# Patient Record
Sex: MALE | Race: WHITE
[De-identification: names, ages, dates, MRNs, and addresses within clinical notes are randomized per-mention and may not be internally consistent; named-entity substitution may affect disease eponyms.]

---

## 2018-11-03 ENCOUNTER — HOSPITAL ENCOUNTER (EMERGENCY)
Dept: HOSPITAL 47 - EC | Age: 38
Discharge: HOME | End: 2018-11-03
Payer: COMMERCIAL

## 2018-11-03 VITALS
HEART RATE: 74 BPM | TEMPERATURE: 98.2 F | DIASTOLIC BLOOD PRESSURE: 84 MMHG | RESPIRATION RATE: 18 BRPM | SYSTOLIC BLOOD PRESSURE: 132 MMHG

## 2018-11-03 DIAGNOSIS — F17.200: ICD-10-CM

## 2018-11-03 DIAGNOSIS — K52.9: Primary | ICD-10-CM

## 2018-11-03 LAB
ALBUMIN SERPL-MCNC: 4.4 G/DL (ref 3.5–5)
ALP SERPL-CCNC: 85 U/L (ref 38–126)
ALT SERPL-CCNC: 32 U/L (ref 21–72)
ANION GAP SERPL CALC-SCNC: 10 MMOL/L
AST SERPL-CCNC: 25 U/L (ref 17–59)
BASOPHILS # BLD AUTO: 0.1 K/UL (ref 0–0.2)
BASOPHILS NFR BLD AUTO: 1 %
BUN SERPL-SCNC: 18 MG/DL (ref 9–20)
CALCIUM SPEC-MCNC: 9.7 MG/DL (ref 8.4–10.2)
CHLORIDE SERPL-SCNC: 106 MMOL/L (ref 98–107)
CO2 SERPL-SCNC: 24 MMOL/L (ref 22–30)
EOSINOPHIL # BLD AUTO: 0.2 K/UL (ref 0–0.7)
EOSINOPHIL NFR BLD AUTO: 2 %
ERYTHROCYTE [DISTWIDTH] IN BLOOD BY AUTOMATED COUNT: 5.27 M/UL (ref 4.3–5.9)
ERYTHROCYTE [DISTWIDTH] IN BLOOD: 12.6 % (ref 11.5–15.5)
GLUCOSE SERPL-MCNC: 95 MG/DL (ref 74–99)
HCT VFR BLD AUTO: 45 % (ref 39–53)
HGB BLD-MCNC: 15 GM/DL (ref 13–17.5)
LIPASE SERPL-CCNC: 257 U/L (ref 23–300)
LYMPHOCYTES # SPEC AUTO: 2.5 K/UL (ref 1–4.8)
LYMPHOCYTES NFR SPEC AUTO: 28 %
MCH RBC QN AUTO: 28.5 PG (ref 25–35)
MCHC RBC AUTO-ENTMCNC: 33.3 G/DL (ref 31–37)
MCV RBC AUTO: 85.5 FL (ref 80–100)
MONOCYTES # BLD AUTO: 0.5 K/UL (ref 0–1)
MONOCYTES NFR BLD AUTO: 6 %
NEUTROPHILS # BLD AUTO: 5.5 K/UL (ref 1.3–7.7)
NEUTROPHILS NFR BLD AUTO: 62 %
PLATELET # BLD AUTO: 226 K/UL (ref 150–450)
POTASSIUM SERPL-SCNC: 3.9 MMOL/L (ref 3.5–5.1)
PROT SERPL-MCNC: 7.4 G/DL (ref 6.3–8.2)
SODIUM SERPL-SCNC: 140 MMOL/L (ref 137–145)
WBC # BLD AUTO: 9 K/UL (ref 3.8–10.6)

## 2018-11-03 PROCEDURE — 36415 COLL VENOUS BLD VENIPUNCTURE: CPT

## 2018-11-03 PROCEDURE — 74177 CT ABD & PELVIS W/CONTRAST: CPT

## 2018-11-03 PROCEDURE — 99284 EMERGENCY DEPT VISIT MOD MDM: CPT

## 2018-11-03 PROCEDURE — 85025 COMPLETE CBC W/AUTO DIFF WBC: CPT

## 2018-11-03 PROCEDURE — 80053 COMPREHEN METABOLIC PANEL: CPT

## 2018-11-03 PROCEDURE — 83690 ASSAY OF LIPASE: CPT

## 2018-11-03 NOTE — CT
EXAMINATION TYPE: CT abdomen pelvis w con

 

DATE OF EXAM: 11/3/2018

 

COMPARISON: None

 

HISTORY: lower abdominal pain

 

CT DLP: 1019.2 mGycm

Automated exposure control for dose reduction was used.

 

TECHNIQUE:  Helical acquisition of images was performed from the lung bases through the pelvis.

 

CONTRAST: 

Performed without Oral Contrast and with IV Contrast, patient injected with 100mL mL of Isovue 300.

 

FINDINGS: 

 

Lung bases are clear. There is no pleural effusion. Heart appears normal.

 

Liver spleen pancreas gallbladder appear normal. Bile ducts are not dilated. There is no adrenal mass
.

 

Kidneys show satisfactory contrast opacification. There is no hydronephrosis. Appendix measures 8 mm 
and shows no sign of inflammation.

 

There is no retroperitoneal adenopathy. There is no inguinal hernia. Bladder distends smoothly. There
 are multiple small sigmoid diverticula. There is mild wall thickening of the mid descending colon. T
here is no free fluid. There is no sign of free air. Lumbar spine appears intact. Bony pelvis appears
 normal.

IMPRESSION: 

SIGMOID DIVERTICULOSIS. SLIGHT THICKENING OF THE DESCENDING COLON THAT COULD RELATE TO MILD COLITIS.

## 2018-11-03 NOTE — ED
General Adult HPI





- General


Source: patient


Mode of arrival: ambulatory


Limitations: no limitations





<Pramod Gonzalez - Last Filed: 11/03/18 00:39>





<Haresh Guevara - Last Filed: 11/03/18 02:26>





- General


Chief complaint: Abdominal Pain


Stated complaint: abd pain





- History of Present Illness


Initial comments: 





Dictation was produced using dragon dictation software. please excuse any 

grammatical, word or spelling errors. 





Chief Complaint: 37-year-old  male presents with left lower quadrant/

suprapubic pain.





History of Present Illness: 37-year-old  male presents with 2 days of 

left lower quadrant and suprapubic pain.  Patient states he's been having 

change in bowel habits.  He states that his stool is a lot softer than usual.  

He does report some constitutional symptoms.  He states he has some chills.  

Since his pain is sharp and worse with palpation and located in the left lower 

quadrant/suprapubic area.  Patient has a history of diverticulitis.  No nausea 

vomiting.  Denies any history of abdominal surgery.








The ROS documented in this emergency department record has been reviewed and 

confirmed by me.  Those systems with pertinent positive or negative responses 

have been documented in the HPI.  All other systems are other negative and/or 

noncontributory.


 (Pramod Gonzalez)





- Related Data


 Previous Rx's











 Medication  Instructions  Recorded


 


Ciprofloxacin HCl [Cipro] 500 mg PO Q12HR #14 tablet 11/03/18


 


metroNIDAZOLE [Flagyl] 500 mg PO TID #21 tab 11/03/18











 Allergies











Allergy/AdvReac Type Severity Reaction Status Date / Time


 


No Known Allergies Allergy   Verified 11/03/18 00:25














Review of Systems


ROS Other: All systems not noted in ROS Statement are negative.





<Pramod Gonzalez - Last Filed: 11/03/18 00:39>


ROS Other: All systems not noted in ROS Statement are negative.





<Haresh Guevara - Last Filed: 11/03/18 02:26>


ROS Statement: 


Those systems with pertinent positive or pertinent negative responses have been 

documented in the HPI.








Past Medical History


Past Medical History: No Reported History


History of Any Multi-Drug Resistant Organisms: None Reported


Past Surgical History: No Surgical Hx Reported


Past Psychological History: No Psychological Hx Reported


Smoking Status: Current every day smoker


Past Alcohol Use History: Occasional


Past Drug Use History: None Reported





<Pramod Gonzalez - Last Filed: 11/03/18 00:39>





General Exam


Limitations: no limitations





<Pramod Gonzalez - Last Filed: 11/03/18 00:39>





<Haresh Guevara - Last Filed: 11/03/18 02:26>





- General Exam Comments


Initial Comments: 








PHYSICAL EXAM:


General Impression: Alert and oriented x3, not in acute distress


HEENT: Normocephalic atraumatic, extra-ocular movements intact, pupils equal 

and reactive to light bilaterally, mucous membranes moist.


Cardiovascular: Heart regular rate and rhythm, S1&S2 audible, no murmurs, rubs 

or gallops


Chest: Lungs clear to auscultation bilaterally, no rhonchi, no wheeze, no rales


Abdomen: In the left lower quadrant worse with palpation


Musculoskeletal: Pulses present and equal in all extremities, no peripheral 

edema


Motor: Power 5/5 bilaterally, no focal deficits noted


Neurological: CN II-XII grossly intact, no focal motor or sensory deficits noted


Skin: Intact with no visualized rashes


Psych: Normal affect and mood (Pramod Gonzalez)





 Vital Signs











  11/03/18





  00:20


 


Temperature 97.9 F


 


Pulse Rate 79


 


Respiratory 20





Rate 


 


Blood Pressure 130/82


 


O2 Sat by Pulse 99





Oximetry 














Medical Decision Making





<Pramod Gonzalez - Last Filed: 11/03/18 00:39>





- Lab Data


Result diagrams: 


 11/03/18 00:50





 11/03/18 00:50





<Haresh Guevara - Last Filed: 11/03/18 02:26>





- Medical Decision Making











ED course: 37-year-old  male with no significant medical history 

presents with 2 days of left lower quadrant abdominal pain.  There is clinical 

suspicion that his symptoms represent diverticulitis.  He does have 

constitutional symptoms.  As upon arrival are within acceptable limits.  He is 

afebrile.  Laboratory evaluation obtained and CT abdomen and pelvis were 

obtained.  It was sent out to oncoming physician for follow-up of labs and 

imaging studies.


 (Pramod Gonzalez)





- Lab Data





 Lab Results











  11/03/18 11/03/18 Range/Units





  00:50 00:50 


 


WBC  9.0   (3.8-10.6)  k/uL


 


RBC  5.27   (4.30-5.90)  m/uL


 


Hgb  15.0   (13.0-17.5)  gm/dL


 


Hct  45.0   (39.0-53.0)  %


 


MCV  85.5   (80.0-100.0)  fL


 


MCH  28.5   (25.0-35.0)  pg


 


MCHC  33.3   (31.0-37.0)  g/dL


 


RDW  12.6   (11.5-15.5)  %


 


Plt Count  226   (150-450)  k/uL


 


Neutrophils %  62   %


 


Lymphocytes %  28   %


 


Monocytes %  6   %


 


Eosinophils %  2   %


 


Basophils %  1   %


 


Neutrophils #  5.5   (1.3-7.7)  k/uL


 


Lymphocytes #  2.5   (1.0-4.8)  k/uL


 


Monocytes #  0.5   (0-1.0)  k/uL


 


Eosinophils #  0.2   (0-0.7)  k/uL


 


Basophils #  0.1   (0-0.2)  k/uL


 


Sodium   140  (137-145)  mmol/L


 


Potassium   3.9  (3.5-5.1)  mmol/L


 


Chloride   106  ()  mmol/L


 


Carbon Dioxide   24  (22-30)  mmol/L


 


Anion Gap   10  mmol/L


 


BUN   18  (9-20)  mg/dL


 


Creatinine   0.79  (0.66-1.25)  mg/dL


 


Est GFR (CKD-EPI)AfAm   >90  (>60 ml/min/1.73 sqM)  


 


Est GFR (CKD-EPI)NonAf   >90  (>60 ml/min/1.73 sqM)  


 


Glucose   95  (74-99)  mg/dL


 


Calcium   9.7  (8.4-10.2)  mg/dL


 


Total Bilirubin   0.5  (0.2-1.3)  mg/dL


 


AST   25  (17-59)  U/L


 


ALT   32  (21-72)  U/L


 


Alkaline Phosphatase   85  ()  U/L


 


Total Protein   7.4  (6.3-8.2)  g/dL


 


Albumin   4.4  (3.5-5.0)  g/dL


 


Lipase   257  ()  U/L














Disposition





<Pramod Gonzalez - Last Filed: 11/03/18 00:39>


Is patient prescribed a controlled substance at d/c from ED?: No





<Haresh Guevara - Last Filed: 11/03/18 02:26>


Clinical Impression: 


 Colitis





Disposition: HOME SELF-CARE


Condition: Good


Instructions:  Colitis (ED)


Prescriptions: 


Ciprofloxacin HCl [Cipro] 500 mg PO Q12HR #14 tablet


metroNIDAZOLE [Flagyl] 500 mg PO TID #21 tab


Referrals: 


Coy Bhatt MD [STAFF PHYSICIAN] - 1-2 days

## 2019-12-15 ENCOUNTER — HOSPITAL ENCOUNTER (EMERGENCY)
Dept: HOSPITAL 47 - EC | Age: 39
Discharge: HOME | End: 2019-12-15
Payer: COMMERCIAL

## 2019-12-15 VITALS — DIASTOLIC BLOOD PRESSURE: 72 MMHG | HEART RATE: 64 BPM | TEMPERATURE: 98.2 F | SYSTOLIC BLOOD PRESSURE: 158 MMHG

## 2019-12-15 VITALS — RESPIRATION RATE: 18 BRPM

## 2019-12-15 DIAGNOSIS — F17.200: ICD-10-CM

## 2019-12-15 DIAGNOSIS — K08.89: Primary | ICD-10-CM

## 2019-12-15 PROCEDURE — 99283 EMERGENCY DEPT VISIT LOW MDM: CPT

## 2019-12-15 PROCEDURE — 64400 NJX AA&/STRD TRIGEMINAL NRV: CPT

## 2019-12-15 NOTE — ED
ENT HPI





- General


Source: EMS


Mode of arrival: EMS


Limitations: no limitations





<John West - Last Filed: 12/15/19 17:20>





<Marielos Spangler - Last Filed: 12/16/19 14:31>





- General


Chief complaint: Dental/Oral


Stated complaint: Dental Pain


Time Seen by Provider: 12/15/19 15:55





- History of Present Illness


Initial comments: 





Patient is a 38-year-old male presenting to the emergency department with a 

chief complaint of dental pain.  She days ago patient had a wisdom tooth removed

on the left lower side.  Patient reports he has been using the antibiotics, 

mouthwash as directed.  Until yesterday when he developed a sudden onset of left

lower jaw pain.  She reports the pain is exacerbated when eating, talking.  He 

denies any facial swelling, erythema, night sweats fevers or chills.  Reports 

taking over-the-counter analgesics a minimal improvement. (John West)





- Related Data


                                  Previous Rx's











 Medication  Instructions  Recorded


 


Ciprofloxacin HCl [Cipro] 500 mg PO Q12HR #14 tablet 11/03/18


 


metroNIDAZOLE [Flagyl] 500 mg PO TID #21 tab 11/03/18











                                    Allergies











Allergy/AdvReac Type Severity Reaction Status Date / Time


 


No Known Allergies Allergy   Verified 11/03/18 00:25














Review of Systems


ROS Other: All systems not noted in ROS Statement are negative.





<John West - Last Filed: 12/15/19 17:20>


ROS Other: All systems not noted in ROS Statement are negative.





<Marielos Spangler - Last Filed: 12/16/19 14:31>


ROS Statement: 


Those systems with pertinent positive or pertinent negative responses have been 

documented in the HPI.








Past Medical History


Past Medical History: No Reported History


History of Any Multi-Drug Resistant Organisms: None Reported


Past Surgical History: No Surgical Hx Reported


Past Psychological History: No Psychological Hx Reported


Smoking Status: Current every day smoker


Past Alcohol Use History: Occasional


Past Drug Use History: None Reported





<John West - Last Filed: 12/15/19 17:20>





General Exam


Limitations: no limitations


General appearance: alert, in no apparent distress


Head exam: Present: atraumatic, normocephalic, normal inspection


Eye exam: Present: normal appearance, PERRL, EOMI


Pupils: Present: normal accommodation


ENT exam: Present: normal exam, normal oropharynx (Tenderness along tooth #21.  

No oral lesions.  No gumline erythema or periapical abscesses.), mucous 

membranes moist, TM's normal bilaterally, normal external ear exam


Neck exam: Present: normal inspection, full ROM


Respiratory exam: Present: normal lung sounds bilaterally


Cardiovascular Exam: Present: regular rate, normal rhythm, normal heart sounds


Extremities exam: Present: normal inspection, full ROM


Back exam: Present: normal inspection, full ROM


Neurological exam: Present: alert, oriented X3


Psychiatric exam: Present: normal affect, normal mood


Skin exam: Present: warm, dry, intact, normal color





<John West - Last Filed: 12/15/19 17:20>





Course





                                   Vital Signs











  12/15/19 12/15/19





  15:48 17:41


 


Temperature 98.4 F 98.2 F


 


Pulse Rate 68 64


 


Respiratory 18 18





Rate  


 


Blood Pressure 165/83 158/72


 


O2 Sat by Pulse 99 98





Oximetry  














Medical Decision Making





<John West - Last Filed: 12/15/19 17:20>





<Marielos Spangler - Last Filed: 12/16/19 14:31>





- Medical Decision Making





Patient is a 38-year-old male presenting to the emergency department with chief 

complaint of dental pain.  Patient recently had a wisdom tooth removal.  Now has

 pain along that side of the jaw.  No facial swelling.  No signs of periapical 

abscesses.  No signs of erythema or infection along the gumline.  Patient given 

a dental block using Marcaine.  Patient reports significant improvement in his 

symptoms.  Patient also given Tylenol 3 starter pack advised about the possible 

side effects of medication.  Strict return parameters were thoroughly discussed 

with patient was standing agreeable.  Patient vised follow primary care.  Case 

discussed with physician. (John West)


I was available for consultation in the emergency department.  The history and 

physical exam were done by the midlevel provider. I was consulted for this 

patients care. I reviewed the case with the midlevel provider and based on 

their presentation of the patient, I agree with the assessment, medical decision

 making and plan of care as documented. 


Chart was dictated using Dragon dictation software.  Attempts were made to co

rrect any dictation errors however some typographical errors may persist. 


 (Marielos Spangler)





Disposition


Is patient prescribed a controlled substance at d/c from ED?: No


Time of Disposition: 17:21





<John West - Last Filed: 12/15/19 17:20>





<Marielos Spangler - Last Filed: 12/16/19 14:31>


Clinical Impression: 


 Pain, dental





Disposition: HOME SELF-CARE


Condition: Stable


Instructions (If sedation given, give patient instructions):  Toothache (ED)


Additional Instructions: 


Please follow with a dentist please return to emergency department if symptoms 

worsen.  Alternate between Tylenol and ibuprofen for pain control.


Referrals: 


None,Stated [Primary Care Provider] - 1-2 days

## 2023-01-25 ENCOUNTER — HOSPITAL ENCOUNTER (EMERGENCY)
Dept: HOSPITAL 47 - EC | Age: 43
Discharge: HOME | End: 2023-01-25
Payer: COMMERCIAL

## 2023-01-25 VITALS — SYSTOLIC BLOOD PRESSURE: 113 MMHG | RESPIRATION RATE: 20 BRPM | HEART RATE: 66 BPM | DIASTOLIC BLOOD PRESSURE: 71 MMHG

## 2023-01-25 VITALS — TEMPERATURE: 97.8 F

## 2023-01-25 DIAGNOSIS — F17.210: ICD-10-CM

## 2023-01-25 DIAGNOSIS — R09.1: Primary | ICD-10-CM

## 2023-01-25 LAB
ALBUMIN SERPL-MCNC: 4.7 G/DL (ref 3.5–5)
ALP SERPL-CCNC: 98 U/L (ref 38–126)
ALT SERPL-CCNC: 31 U/L (ref 4–49)
ANION GAP SERPL CALC-SCNC: 8 MMOL/L
APTT BLD: 27.8 SEC (ref 22–30)
AST SERPL-CCNC: 30 U/L (ref 17–59)
BASOPHILS # BLD AUTO: 0.1 K/UL (ref 0–0.2)
BASOPHILS NFR BLD AUTO: 1 %
BUN SERPL-SCNC: 25 MG/DL (ref 9–20)
CALCIUM SPEC-MCNC: 9.4 MG/DL (ref 8.4–10.2)
CHLORIDE SERPL-SCNC: 105 MMOL/L (ref 98–107)
CO2 SERPL-SCNC: 26 MMOL/L (ref 22–30)
EOSINOPHIL # BLD AUTO: 0.4 K/UL (ref 0–0.7)
EOSINOPHIL NFR BLD AUTO: 4 %
ERYTHROCYTE [DISTWIDTH] IN BLOOD BY AUTOMATED COUNT: 5.36 M/UL (ref 4.3–5.9)
ERYTHROCYTE [DISTWIDTH] IN BLOOD: 12.5 % (ref 11.5–15.5)
GLUCOSE SERPL-MCNC: 101 MG/DL (ref 74–99)
HCT VFR BLD AUTO: 45.6 % (ref 39–53)
HGB BLD-MCNC: 15.8 GM/DL (ref 13–17.5)
INR PPP: 1 (ref ?–1.2)
LYMPHOCYTES # SPEC AUTO: 1.9 K/UL (ref 1–4.8)
LYMPHOCYTES NFR SPEC AUTO: 20 %
MAGNESIUM SPEC-SCNC: 1.8 MG/DL (ref 1.6–2.3)
MCH RBC QN AUTO: 29.5 PG (ref 25–35)
MCHC RBC AUTO-ENTMCNC: 34.7 G/DL (ref 31–37)
MCV RBC AUTO: 85 FL (ref 80–100)
MONOCYTES # BLD AUTO: 0.5 K/UL (ref 0–1)
MONOCYTES NFR BLD AUTO: 5 %
NEUTROPHILS # BLD AUTO: 6.8 K/UL (ref 1.3–7.7)
NEUTROPHILS NFR BLD AUTO: 70 %
PLATELET # BLD AUTO: 260 K/UL (ref 150–450)
POTASSIUM SERPL-SCNC: 4.3 MMOL/L (ref 3.5–5.1)
PROT SERPL-MCNC: 7.9 G/DL (ref 6.3–8.2)
PT BLD: 10.6 SEC (ref 9–12)
SODIUM SERPL-SCNC: 139 MMOL/L (ref 137–145)
WBC # BLD AUTO: 9.8 K/UL (ref 3.8–10.6)

## 2023-01-25 PROCEDURE — 80053 COMPREHEN METABOLIC PANEL: CPT

## 2023-01-25 PROCEDURE — 85025 COMPLETE CBC W/AUTO DIFF WBC: CPT

## 2023-01-25 PROCEDURE — 71046 X-RAY EXAM CHEST 2 VIEWS: CPT

## 2023-01-25 PROCEDURE — 99285 EMERGENCY DEPT VISIT HI MDM: CPT

## 2023-01-25 PROCEDURE — 85610 PROTHROMBIN TIME: CPT

## 2023-01-25 PROCEDURE — 83735 ASSAY OF MAGNESIUM: CPT

## 2023-01-25 PROCEDURE — 85730 THROMBOPLASTIN TIME PARTIAL: CPT

## 2023-01-25 PROCEDURE — 36415 COLL VENOUS BLD VENIPUNCTURE: CPT

## 2023-01-25 PROCEDURE — 93005 ELECTROCARDIOGRAM TRACING: CPT

## 2023-01-25 PROCEDURE — 84484 ASSAY OF TROPONIN QUANT: CPT

## 2023-01-25 NOTE — XR
EXAMINATION TYPE: XR chest 2V

 

DATE OF EXAM: 1/25/2023 1:35 PM

 

COMPARISON: None

 

TECHNIQUE: XR chest 2V Frontal and lateral views of the chest.

 

CLINICAL INDICATION:Male, 42 years old with history of BRENNAN; 

 

FINDINGS: 

Lungs/Pleura: Patchy right middle lobe airspace opacity. No pleural effusion or pneumothorax. 

Pulmonary vascularity: Unremarkable.

Heart/mediastinum: Cardiomediastinal silhouette is unremarkable.

Musculoskeletal: No acute osseous pathology.

 

 

IMPRESSION: 

Patchy right middle lobe airspace opacity which may represent atelectasis versus infiltrate in the ap
propriate clinical setting.
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
